# Patient Record
Sex: FEMALE | Race: WHITE | NOT HISPANIC OR LATINO | Employment: FULL TIME | URBAN - METROPOLITAN AREA
[De-identification: names, ages, dates, MRNs, and addresses within clinical notes are randomized per-mention and may not be internally consistent; named-entity substitution may affect disease eponyms.]

---

## 2017-06-16 ENCOUNTER — HOSPITAL ENCOUNTER (EMERGENCY)
Facility: CLINIC | Age: 22
Discharge: HOME | End: 2017-06-16
Attending: PHYSICIAN ASSISTANT | Admitting: PHYSICIAN ASSISTANT

## 2017-06-16 ENCOUNTER — APPOINTMENT (OUTPATIENT)
Dept: CT IMAGING | Facility: CLINIC | Age: 22
End: 2017-06-16
Attending: PHYSICIAN ASSISTANT

## 2017-06-16 VITALS
SYSTOLIC BLOOD PRESSURE: 114 MMHG | OXYGEN SATURATION: 98 % | DIASTOLIC BLOOD PRESSURE: 70 MMHG | HEIGHT: 67 IN | BODY MASS INDEX: 31.39 KG/M2 | RESPIRATION RATE: 16 BRPM | TEMPERATURE: 98.3 F | WEIGHT: 200 LBS

## 2017-06-16 DIAGNOSIS — S09.90XA CLOSED HEAD INJURY, INITIAL ENCOUNTER: ICD-10-CM
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
## 2017-06-16 DIAGNOSIS — R51.9 NONINTRACTABLE HEADACHE, UNSPECIFIED CHRONICITY PATTERN, UNSPECIFIED HEADACHE TYPE: ICD-10-CM

## 2017-06-16 PROCEDURE — 70450 CT HEAD/BRAIN W/O DYE: CPT

## 2017-06-16 PROCEDURE — 25000132 ZZH RX MED GY IP 250 OP 250 PS 637: Performed by: PHYSICIAN ASSISTANT

## 2017-06-16 PROCEDURE — 99284 EMERGENCY DEPT VISIT MOD MDM: CPT | Mod: 25

## 2017-06-16 RX ORDER — ACETAMINOPHEN 325 MG/1
650 TABLET ORAL ONCE
Status: COMPLETED | OUTPATIENT
Start: 2017-06-16 | End: 2017-06-16

## 2017-06-16 RX ADMIN — ACETAMINOPHEN 650 MG: 325 TABLET, FILM COATED ORAL at 13:38

## 2017-06-16 ASSESSMENT — ENCOUNTER SYMPTOMS
ABDOMINAL PAIN: 0
FEVER: 0
VOMITING: 0
NECK PAIN: 0
BACK PAIN: 0
HEADACHES: 1
DIZZINESS: 0
NUMBNESS: 0
NAUSEA: 0
WEAKNESS: 0
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
## 2017-06-16 NOTE — DISCHARGE INSTRUCTIONS
Discharge Instructions  Head Injury    You have been seen today for a head injury. You were checked for serious problems, like bleeding on the brain, but these problems cannot always be found right away.  Due to this risk, you should not be alone for 24 hours after your injury.  Follow up with your regular physician in 3 days if not improving.    Return to the Emergency Department if:    You are confused, have amnesia, or you are not acting right.    Your headache gets worse or you start to have a really bad headache even with your recommended treatment plan.    You vomit more than once.    You have a convulsion or seizure.    You have trouble walking.    You have weakness or paralysis in an arm or a leg.    You have blood or fluid coming from your ears or nose.    You have new symptoms or anything that worries you.    Sleeping:  It is okay for you to sleep, but someone should wake you up as instructed by your doctor, and someone should check on you at your usual time to wake up.     Activity:    Do not drive for at least 24 hours.    Do not drive if you have dizzy spells or trouble concentrating, or remembering things.    Do not return to any contact sports until cleared by your regular doctor.     Follow-up:  It is very important that you make an appointment with your clinic and go to the appointment.  If you do not follow-up with your regular doctor, it may result in missing an important development which could result in permanent injury or disability and/or lasting pain.  If there is any problem keeping your appointment, call your doctor or return to the Emergency Department.    MORE INFORMATION:    Concussion:  A concussion is a minor head injury that may cause temporary problems with the way your brain works.  Some symptoms include:  confusion, amnesia, nausea and vomiting, dizziness, fatigue, memory or concentration problems, irritability and sleep problems.    CT Scans: Your evaluation today may have  included a CT scan (CAT scan) to look for things like bleeding or a skull fracture (break).  CT scans involve radiation and too many CT scans can cause serious health problems like cancer, especially in children.  Because of this, your doctor may not have ordered a CT scan today if they think you are at low risk for a serious or life threatening problem.    If you were given a prescription for medicine here today, be sure to read all of the information (including the package insert) that comes with your prescription.  This will include important information about the medicine, its side effects, and any warnings that you need to know about.  The pharmacist who fills the prescription can provide more information and answer questions you may have about the medicine.  If you have questions or concerns that the pharmacist cannot address, please call or return to the Emergency Department.     Concussions  [facility]  What is a concussion?  A concussion is a mild traumatic brain injury (TBI) that occurs from a direct blow, bump or jolt to the head. It can also result from a blow to another part of the body when the force travels up to the head. A concussion can affect coordination, learning, memory and emotions.  Most concussions heal without issue. However, like any other injury, a brain injury should be given time to heal, which includes physical and mental rest (free from mental straining and visual stimuli like video rema and texting).  Signs and symptoms  Common signs and symptoms may include:    Altered mental status, including confusion, inappropriate emotions, agitation or abrupt change in personality    Amnesia/memory problems    Blurred vision, double vision, seeing stars or black spots    Dizziness, poor balance, or unsteadiness    Excessive or persistent headache    Excessive fatigue    Excessive sensitivity to light or loud noise    Feel  in a fog     Loss of consciousness or  orientation    Poor balance/coordination    Ringing in ears    Vacant stare/glassy eyed    Vomiting  One of the most severe problems that can occur is bleeding inside the brain. If bleeding or swelling of the brain occurs, pressure in the skull rises and can cause brain injury. Bleeding may develop right after an injury or hours later, so monitoring symptoms is critical. This can be life threatening.    Why do a baseline computer test (ImPACT)?  Neurocognitive tests, such as ImPACT, provide information about how the brain is responding to injury. ImPACT has two components: a pre- and post-concussion test. The pretest scoring represents one s baseline (normal) brain function.  The test is then repeated post injury. Results of the pre- and post-concussion tests are compared and a care plan is developed. If a pre-test was not completed prior to a concussion, a post-concussion test is still helpful in the assessment of brain function. An ImPACT test should be completed yearly due to the natural maturing of the brain.    What can I expect from the [facility] concussion program? [facility] providers will:    Facilitate completion of baseline tests    Manage concussion symptoms and make recommendations for return to previous activity level    Facilitate post-concussion testing    Refer to other health-care providers, as needed,  including neuropsychologists, neurologists and therapists who specialize in concussion management    When is it safe to return to activity?  A person should be free of symptoms and have returned  to normal sleeping and eating patterns as well as typical concentration levels before resuming activity. Once normal activities have resumed and there are no symptoms at rest, more demanding activities may be tried. Over time, activity will be increased as long as symptoms do not return. A gradual return to activities allows us the opportunity to assess brain healing.  Under  no circumstances should anyone return to activity while experiencing concussion signs or symptoms.    For more information contact:  Concussion hotline: [phone]  To schedule an appointment: [phone]  Cost of computer testing (ImPACT)  Pre-concussion testing - $5  Post-concussion testing:    Option 1: $20 includes all post testing; excludes doctor review.    Option 2: [facility] doctor visit, including all post testing and review.
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
## 2017-06-16 NOTE — ED AVS SNAPSHOT
Emergency Department    [address] [place] [address]    Phone:  [phone]    Fax:  [phone]                                       Arelis Lomeli   MRN: [id number]    Department:   Emergency Department   Date of Visit:  6/16/2017           After Visit Summary Signature Page     I have received my discharge instructions, and my questions have been answered. I have discussed any challenges I see with this plan with the nurse or doctor.    ..........................................................................................................................................  Patient/Patient Representative Signature      ..........................................................................................................................................  Patient Representative Print Name and Relationship to Patient    ..................................................               ................................................  Date                                            Time    ..........................................................................................................................................  Reviewed by Signature/Title    ...................................................              ..............................................  Date                                                            Time

## 2017-06-28 ENCOUNTER — OFFICE VISIT (OUTPATIENT)
Dept: OBGYN | Facility: CLINIC | Age: 22
End: 2017-06-28
Payer: COMMERCIAL

## 2017-06-28 VITALS
BODY MASS INDEX: 31.48 KG/M2 | DIASTOLIC BLOOD PRESSURE: 70 MMHG | OXYGEN SATURATION: 98 % | HEART RATE: 86 BPM | SYSTOLIC BLOOD PRESSURE: 134 MMHG | WEIGHT: 201 LBS

## 2017-06-28 DIAGNOSIS — Z20.2 EXPOSURE TO SEXUALLY TRANSMITTED DISEASE (STD): Primary | ICD-10-CM

## 2017-06-28 DIAGNOSIS — Z11.3 SCREEN FOR STD (SEXUALLY TRANSMITTED DISEASE): ICD-10-CM

## 2017-06-28 PROCEDURE — 87591 N.GONORRHOEAE DNA AMP PROB: CPT | Performed by: ADVANCED PRACTICE MIDWIFE

## 2017-06-28 PROCEDURE — 99213 OFFICE O/P EST LOW 20 MIN: CPT | Performed by: ADVANCED PRACTICE MIDWIFE

## 2017-06-28 PROCEDURE — 87491 CHLMYD TRACH DNA AMP PROBE: CPT | Performed by: ADVANCED PRACTICE MIDWIFE

## 2017-06-28 RX ORDER — AZITHROMYCIN 500 MG/1
1000 TABLET, FILM COATED ORAL ONCE
Qty: 2 TABLET | Refills: 0 | Status: SHIPPED | OUTPATIENT
Start: 2017-06-28 | End: 2017-06-28

## 2017-06-28 RX ORDER — AZITHROMYCIN 1 G/1
1 POWDER, FOR SUSPENSION ORAL ONCE
Qty: 1 EACH | Refills: 0 | Status: SHIPPED | OUTPATIENT
Start: 2017-06-28 | End: 2017-06-28

## 2017-06-28 NOTE — NURSING NOTE
"Chief Complaint   Patient presents with     STD   Partner tested positive for chlamydia     Initial /70 (BP Location: Right arm, Cuff Size: Adult Large)  Pulse 86  Wt 201 lb (91.2 kg)  LMP 06/05/2017 (Exact Date)  SpO2 98%  BMI 31.48 kg/m2 Estimated body mass index is 31.48 kg/(m^2) as calculated from the following:    Height as of 6/16/17: 5' 7\" (1.702 m).    Weight as of this encounter: 201 lb (91.2 kg).  Medication Reconciliation: complete   Deena Ventura MA      "

## 2017-06-28 NOTE — MR AVS SNAPSHOT
"              After Visit Summary   6/28/2017    Arelis Lomeli    MRN: [id number]           Patient Information     Date Of Birth          1995        Visit Information        Provider Department      6/28/2017 9:45 AM Shena Trinidad APRN CNM [facility]        Today's Diagnoses     Exposure to sexually transmitted disease (STD)    -  1    Screen for STD (sexually transmitted disease)           Follow-ups after your visit        Follow-up notes from your care team     Return in about 1 month (around 7/28/2017) for test of cure.      Who to contact     If you have questions or need follow up information about today's clinic visit or your schedule please contact [facility] directly at [phone].  Normal or non-critical lab and imaging results will be communicated to you by MyChart, letter or phone within 4 business days after the clinic has received the results. If you do not hear from us within 7 days, please contact the clinic through MyChart or phone. If you have a critical or abnormal lab result, we will notify you by phone as soon as possible.  Submit refill requests through [employer] or call your pharmacy and they will forward the refill request to us. Please allow 3 business days for your refill to be completed.          Additional Information About Your Visit        MyChart Information     Allmoxy lets you send messages to your doctor, view your test results, renew your prescriptions, schedule appointments and more. To sign up, go to [email or web address] . Click on \"Log in\" on the left side of the screen, which will take you to the Welcome page. Then click on \"Sign up Now\" on the right side of the page.     You will be asked to enter the access code listed below, as well as some personal information. Please follow the directions to create your username and password.     Your access code is: [email or web address]  Expires: 9/14/2017  2:28 PM   "   Your access code will  in 90 days. If you need help or a new code, please call your [facility] clinic or [phone].        Care EveryWhere ID     This is your Care EveryWhere ID. This could be used by other organizations to access your [facility] medical records  [id number]        Your Vitals Were     Pulse Last Period Pulse Oximetry BMI (Body Mass Index)          86 2017 (Exact Date) 98% 31.48 kg/m2         Blood Pressure from Last 3 Encounters:   17 134/70   17 114/70   16 100/62    Weight from Last 3 Encounters:   17 201 lb (91.2 kg)   17 200 lb (90.7 kg)   16 195 lb (88.5 kg)              We Performed the Following     CHLAMYDIA TRACHOMATIS PCR     NEISSERIA GONORRHOEA PCR          Today's Medication Changes          These changes are accurate as of: 17 10:05 AM.  If you have any questions, ask your nurse or doctor.               Start taking these medicines.        Dose/Directions    azithromycin 500 MG tablet   Commonly known as:  ZITHROMAX   Used for:  Exposure to sexually transmitted disease (STD)   Started by:  Shena Trinidad APRN CNM        Dose:  1000 mg   Take 2 tablets (1,000 mg) by mouth once for 1 dose   Quantity:  2 tablet   Refills:  0            Where to get your medicines      These medications were sent to [facility] [id number] [place] [address] [address] AT [place] [address] [address], [facility] [address]    Hours:  24-hours Phone:  [phone]     azithromycin 500 MG tablet                Primary Care Provider Office Phone # Fax #    [facility] [phone] [address] [address]        Equal Access to Services     ALESSIO BECERRA AH: Deejay Sheldon, wapili lusena, qaybta kaalmada ena, carlos napier. So [facility] [phone].    ATENCIÓN: Si habla español, tiene a ruiz disposición servicios gratuitos de  asistencia lingüística. Smooth al [phone].    We comply with applicable federal civil rights laws and [place] laws. We do not discriminate on the basis of race, color, national origin, age, disability sex, sexual orientation or gender identity.            Thank you!     Thank you for choosing [facility]  for your care. Our goal is always to provide you with excellent care. Hearing back from our patients is one way we can continue to improve our services. Please take a few minutes to complete the written survey that you may receive in the mail after your visit with us. Thank you!             Your Updated Medication List - Protect others around you: Learn how to safely use, store and throw away your medicines at www.disposemymeds.org.          This list is accurate as of: 6/28/17 10:05 AM.  Always use your most recent med list.                   Brand Name Dispense Instructions for use Diagnosis    azithromycin 500 MG tablet    ZITHROMAX    2 tablet    Take 2 tablets (1,000 mg) by mouth once for 1 dose    Exposure to sexually transmitted disease (STD)       ibuprofen 200 MG tablet    ADVIL/MOTRIN     Take 200 mg by mouth every 4 hours as needed for mild pain        levonorgestrel-ethinyl estradiol 0.1-20 MG-MCG per tablet    SRONYX    84 tablet    Take 1 tablet by mouth daily    Surveillance of previously prescribed contraceptive pill       TYLENOL 325 MG Caps   Generic drug:  Acetaminophen      Take by mouth as needed
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
## 2017-06-29 LAB
C TRACH DNA SPEC QL NAA+PROBE: ABNORMAL
N GONORRHOEA DNA SPEC QL NAA+PROBE: NORMAL
SPECIMEN SOURCE: ABNORMAL
SPECIMEN SOURCE: NORMAL

## 2017-06-29 NOTE — PROGRESS NOTES
I spoke with Arelis and informed her of results. Questions answered. Took treatment yesterday, will return to clinic for test of cure in about a month.    Shena Trinidad

## 2018-10-26 ENCOUNTER — TELEPHONE (OUTPATIENT)
Dept: OBGYN | Facility: CLINIC | Age: 23
End: 2018-10-26

## 2018-10-26 NOTE — TELEPHONE ENCOUNTER
Pt is past due for f/u pap smear after previous abnormal.  LMTC and schedule at [facility].  Olga Brown,    Pap Tracking

## 2019-01-30 ENCOUNTER — OFFICE VISIT (OUTPATIENT)
Dept: URGENT CARE | Facility: URGENT CARE | Age: 24
End: 2019-01-30
Payer: COMMERCIAL

## 2019-01-30 VITALS
TEMPERATURE: 97.5 F | WEIGHT: 230 LBS | SYSTOLIC BLOOD PRESSURE: 129 MMHG | OXYGEN SATURATION: 96 % | DIASTOLIC BLOOD PRESSURE: 78 MMHG | HEART RATE: 100 BPM | BODY MASS INDEX: 36.02 KG/M2

## 2019-01-30 DIAGNOSIS — R09.81 NASAL CONGESTION: ICD-10-CM

## 2019-01-30 DIAGNOSIS — B34.9 VIRAL SYNDROME: Primary | ICD-10-CM

## 2019-01-30 PROCEDURE — 99213 OFFICE O/P EST LOW 20 MIN: CPT | Performed by: PHYSICIAN ASSISTANT

## 2019-01-30 RX ORDER — FLUTICASONE PROPIONATE 50 MCG
2 SPRAY, SUSPENSION (ML) NASAL DAILY
Qty: 16 G | Refills: 0 | Status: SHIPPED | OUTPATIENT
Start: 2019-01-30 | End: 2020-11-03

## 2019-01-30 NOTE — LETTER
[facility]  [address] [address]  [phone]      January 30, 2019    RE:  Arelis Lomeli                                                                                                                                                       [address] [facility] [address]            To whom it may concern:    Arelis Lomeli was seen in clinic today for illness.  She may return to work when she has been fever free for 24 hours.          Sincerely,        Kaye Macdonald    [facility]

## 2019-01-31 NOTE — PROGRESS NOTES
Patient presents with:  Cough: x 6 days, throat pain / discomfort, runny nose, congestion nasal drainage, pain / discomfort located in the forehead, denies constipation, diarrhea, fever, chills    SUBJECTIVE:   Arelis Lomeli is a 23 year old female presenting with a chief complaint of:  1) runny nose, congestion   2) sore throat  3) cough, intermittently productive  No known fevers.    No wheezing.    4) Fatigue      Onset of symptoms was as above  Course of illness is worsening.    Severity moderate  Current and Associated symptoms: as above  Treatment measures tried include nyquil.  Predisposing factors include none.  She did have a flu shot this season    Past Medical History:   Diagnosis Date     Atypical squamous cells of undetermined significance (ASCUS) on Papanicolaou smear of cervix 09/06/2016    No HPV was done due to pts. age     NO ACTIVE PROBLEMS      Patient Active Problem List   Diagnosis     Surveillance of previously prescribed contraceptive pill     Papanicolaou smear of cervix with atypical squamous cells of undetermined significance (ASC-US)     Social History     Tobacco Use     Smoking status: Never Smoker     Smokeless tobacco: Never Used   Substance Use Topics     Alcohol use: Yes     Alcohol/week: 0.0 oz     Comment: social       ROS:  CONSTITUTIONAL: as per HPI  INTEGUMENTARY/SKIN: NEGATIVE for worrisome rashes, moles or lesions  ENT/MOUTH: as per HPI  RESP:as per HPI  CV: NEGATIVE for chest pain, palpitations or peripheral edema  GI: NEGATIVE for nausea, abdominal pain, heartburn, or change in bowel habits  MUSCULOSKELETAL: NEGATIVE for significant arthralgias or myalgia  NEURO: NEGATIVE for weakness, dizziness or paresthesias  Review of systems negative except as stated above.    OBJECTIVE  :/78   Pulse 100   Temp 97.5  F (36.4  C)   Wt 104.3 kg (230 lb)   SpO2 96%   Breastfeeding? No   BMI 36.02 kg/m    GENERAL APPEARANCE: healthy, alert and no distress  EYES: EOMI,   PERRL, conjunctiva clear  HENT: ear canals and TM's normal.  Nose and mouth without ulcers, erythema or lesions.  Post nasal drip  HENT: nasal turbinates erythematous and edematous and rhinorrhea yellow  NECK: supple, nontender, no lymphadenopathy  RESP: lungs clear to auscultation - no rales, rhonchi or wheezes  CV: regular rates and rhythm, normal S1 S2, no murmur noted  ABDOMEN:  soft, nontender, no HSM or masses and bowel sounds normal  NEURO: Normal strength and tone, sensory exam grossly normal,  normal speech and mentation  SKIN: no suspicious lesions or rashes    (B34.9) Viral syndrome  (primary encounter diagnosis)  Comment:   Plan: rest.  Ibuprofen as needed for pain, fever.    Salt water gargles.    Continue nyquil at bedtime, use mucinex during the day.        (R09.81) Nasal congestion  Comment:   Plan: fluticasone (FLONASE) 50 MCG/ACT nasal spray            Follow up with primary clinic should symptoms persist or worsen.      Patient expresses understanding and agreement with the assessment and plan as above.

## 2019-01-31 NOTE — PATIENT INSTRUCTIONS
"  (B34.9) Viral syndrome  (primary encounter diagnosis)  Comment:   Plan: rest.  Ibuprofen as needed for pain, fever.    Salt water gargles.    Continue nyquil at bedtime, use mucinex during the day.        (R09.81) Nasal congestion  Comment:   Plan: fluticasone (FLONASE) 50 MCG/ACT nasal spray            Follow up with primary clinic should symptoms persist or worsen.        Patient Education     Viral Syndrome (Adult)  A viral illness may cause a number of symptoms such as fever. Other symptoms depend on the part of the body that the virus affects. If it settles in your nose, throat, and lungs, it may cause cough, sore throat, congestion, runny nose, headache, earache and other ear symptoms, or shortness of breath. If it settles in your stomach and intestinal tract, it may cause nausea, vomiting, cramping, and diarrhea. Sometimes it causes generalized symptoms like \"aching all over,\" feeling tired, loss of energy, or loss of appetite.  A viral illness usually lasts anywhere from several days to several weeks, but sometimes it lasts longer. In some cases, a more serious infection can look like a viral syndrome in the first few days of the illness. You may need another exam and additional tests to know the difference. Watch for the warning signs listed below for when to seek medical advice.  Home care  Follow these guidelines for taking care of yourself at home:    If symptoms are severe, rest at home for the first 2 to 3 days.    Stay away from cigarette smoke - both your smoke and the smoke from others.    You may use over-the-counter acetaminophen or ibuprofen for fever, muscle aching, and headache, unless another medicine was prescribed for this. If you have chronic liver or kidney disease or ever had a stomach ulcer or gastrointestinal bleeding, talk with your healthcare provider before using these medicines. No one who is younger than 18 and ill with a fever should take aspirin. It may cause severe disease or " death.    Your appetite may be poor, so a light diet is fine. Avoid dehydration by drinking 8 to 12, 8-ounce glasses of fluids each day. This may include water; orange juice; lemonade; apple, grape, and cranberry juice; clear fruit drinks; electrolyte replacement and sports drinks; and decaffeinated teas and coffee. If you have been diagnosed with a kidney disease, ask your healthcare provider how much and what types of fluids you should drink to prevent dehydration. If you have kidney disease, drinking too much fluid can cause it build up in the your body and be dangerous to your health.    Over-the-counter remedies won't shorten the length of the illness but may be helpful for symptoms such as cough, sore throat, nasal and sinus congestion, or diarrhea. Don't use decongestants if you have high blood pressure.  Follow-up care  Follow up with your healthcare provider if you do not improve over the next week.  Call 911  Call 911 if any of the following occur:    Convulsion    Feeling weak, dizzy, or like you are going to faint    Chest pain, or more than mild shortness of breath   When to seek medical advice  Call your healthcare provider right away if any of these occur:    Cough with lots of colored sputum (mucus) or blood in your sputum    Chest pain, shortness of breath, wheezing, or trouble breathing    Severe headache; face, neck, or ear pain    Severe, constant pain in the lower right side of your belly (abdominal)    Continued vomiting (can t keep liquids down)    Frequent diarrhea (more than 5 times a day); blood (red or black color) or mucus in diarrhea    Feeling weak, dizzy, or like you are going to faint    Extreme thirst    Fever of 100.4 F (38 C) or higher, or as directed by your healthcare provider  Date Last Reviewed: 4/1/2018 2000-2018 [employer]. [address] [place] [address], [place] [address]. All rights reserved. This information is not intended as a substitute for professional  medical care. Always follow your healthcare professional's instructions.

## 2019-04-28 ENCOUNTER — HOSPITAL ENCOUNTER (EMERGENCY)
Facility: CLINIC | Age: 24
Discharge: HOME | End: 2019-04-29
Attending: EMERGENCY MEDICINE | Admitting: EMERGENCY MEDICINE
Payer: COMMERCIAL

## 2019-04-28 DIAGNOSIS — R10.33 PERIUMBILICAL ABDOMINAL PAIN: ICD-10-CM

## 2019-04-28 LAB
ALBUMIN SERPL-MCNC: 3.3 G/DL (ref 3.4–5)
ALP SERPL-CCNC: 114 U/L (ref 40–150)
ALT SERPL W P-5'-P-CCNC: 19 U/L (ref 0–50)
ANION GAP SERPL CALCULATED.3IONS-SCNC: 6 MMOL/L (ref 3–14)
AST SERPL W P-5'-P-CCNC: 13 U/L (ref 0–45)
BASOPHILS # BLD AUTO: 0 10E9/L (ref 0–0.2)
BASOPHILS NFR BLD AUTO: 0.1 %
BILIRUB SERPL-MCNC: 0.3 MG/DL (ref 0.2–1.3)
BUN SERPL-MCNC: 8 MG/DL (ref 7–30)
CALCIUM SERPL-MCNC: 8.5 MG/DL (ref 8.5–10.1)
CHLORIDE SERPL-SCNC: 108 MMOL/L (ref 94–109)
CO2 SERPL-SCNC: 25 MMOL/L (ref 20–32)
CREAT SERPL-MCNC: 1.07 MG/DL (ref 0.52–1.04)
DIFFERENTIAL METHOD BLD: NORMAL
EOSINOPHIL # BLD AUTO: 0.2 10E9/L (ref 0–0.7)
EOSINOPHIL NFR BLD AUTO: 2.1 %
ERYTHROCYTE [DISTWIDTH] IN BLOOD BY AUTOMATED COUNT: 13.4 % (ref 10–15)
GFR SERPL CREATININE-BSD FRML MDRD: 72 ML/MIN/{1.73_M2}
GLUCOSE SERPL-MCNC: 105 MG/DL (ref 70–99)
HCT VFR BLD AUTO: 40.5 % (ref 35–47)
HGB BLD-MCNC: 13.6 G/DL (ref 11.7–15.7)
IMM GRANULOCYTES # BLD: 0 10E9/L (ref 0–0.4)
IMM GRANULOCYTES NFR BLD: 0.1 %
LIPASE SERPL-CCNC: 106 U/L (ref 73–393)
LYMPHOCYTES # BLD AUTO: 2.2 10E9/L (ref 0.8–5.3)
LYMPHOCYTES NFR BLD AUTO: 29.4 %
MCH RBC QN AUTO: 28.6 PG (ref 26.5–33)
MCHC RBC AUTO-ENTMCNC: 33.6 G/DL (ref 31.5–36.5)
MCV RBC AUTO: 85 FL (ref 78–100)
MONOCYTES # BLD AUTO: 0.4 10E9/L (ref 0–1.3)
MONOCYTES NFR BLD AUTO: 5 %
NEUTROPHILS # BLD AUTO: 4.8 10E9/L (ref 1.6–8.3)
NEUTROPHILS NFR BLD AUTO: 63.3 %
NRBC # BLD AUTO: 0 10*3/UL
NRBC BLD AUTO-RTO: 0 /100
PLATELET # BLD AUTO: 398 10E9/L (ref 150–450)
POTASSIUM SERPL-SCNC: 3.9 MMOL/L (ref 3.4–5.3)
PROT SERPL-MCNC: 6.9 G/DL (ref 6.8–8.8)
RBC # BLD AUTO: 4.75 10E12/L (ref 3.8–5.2)
SODIUM SERPL-SCNC: 139 MMOL/L (ref 133–144)
WBC # BLD AUTO: 7.6 10E9/L (ref 4–11)

## 2019-04-28 PROCEDURE — 81001 URINALYSIS AUTO W/SCOPE: CPT | Performed by: EMERGENCY MEDICINE

## 2019-04-28 PROCEDURE — 81025 URINE PREGNANCY TEST: CPT | Performed by: EMERGENCY MEDICINE

## 2019-04-28 PROCEDURE — 99285 EMERGENCY DEPT VISIT HI MDM: CPT | Mod: 25

## 2019-04-28 PROCEDURE — 83690 ASSAY OF LIPASE: CPT | Performed by: EMERGENCY MEDICINE

## 2019-04-28 PROCEDURE — 80053 COMPREHEN METABOLIC PANEL: CPT | Performed by: EMERGENCY MEDICINE

## 2019-04-28 PROCEDURE — 85025 COMPLETE CBC W/AUTO DIFF WBC: CPT | Performed by: EMERGENCY MEDICINE

## 2019-04-28 ASSESSMENT — MIFFLIN-ST. JEOR: SCORE: 1801.5

## 2019-04-28 NOTE — LETTER
April 29, 2019      To Whom It May Concern:      Arelis Lomeli was seen in our Emergency Department today, 04/29/19.  I expect her condition to improve over the next 1-4 days.  She may return to work/school when improved.    Sincerely,        Bin Vergara MD

## 2019-04-28 NOTE — ED AVS SNAPSHOT
Emergency Department  [address] [place] [address]  Phone:  [phone]  Fax:  [phone]                                    Arelis Lomeli   MRN: [id number]    Department:   Emergency Department   Date of Visit:  4/28/2019           After Visit Summary Signature Page    I have received my discharge instructions, and my questions have been answered. I have discussed any challenges I see with this plan with the nurse or doctor.    ..........................................................................................................................................  Patient/Patient Representative Signature      ..........................................................................................................................................  Patient Representative Print Name and Relationship to Patient    ..................................................               ................................................  Date                                   Time    ..........................................................................................................................................  Reviewed by Signature/Title    ...................................................              ..............................................  Date                                               Time          22EPIC Rev 08/18

## 2019-04-29 ENCOUNTER — APPOINTMENT (OUTPATIENT)
Dept: CT IMAGING | Facility: CLINIC | Age: 24
End: 2019-04-29
Attending: EMERGENCY MEDICINE
Payer: COMMERCIAL

## 2019-04-29 VITALS
DIASTOLIC BLOOD PRESSURE: 78 MMHG | TEMPERATURE: 99.1 F | SYSTOLIC BLOOD PRESSURE: 103 MMHG | WEIGHT: 222.22 LBS | OXYGEN SATURATION: 100 % | HEART RATE: 67 BPM | BODY MASS INDEX: 33.68 KG/M2 | HEIGHT: 68 IN | RESPIRATION RATE: 16 BRPM

## 2019-04-29 LAB
ALBUMIN UR-MCNC: 10 MG/DL
APPEARANCE UR: CLEAR
BACTERIA #/AREA URNS HPF: ABNORMAL /HPF
BILIRUB UR QL STRIP: NEGATIVE
COLOR UR AUTO: YELLOW
GLUCOSE UR STRIP-MCNC: NEGATIVE MG/DL
HCG UR QL: NEGATIVE
HGB UR QL STRIP: ABNORMAL
KETONES UR STRIP-MCNC: NEGATIVE MG/DL
LEUKOCYTE ESTERASE UR QL STRIP: ABNORMAL
MUCOUS THREADS #/AREA URNS LPF: PRESENT /LPF
NITRATE UR QL: NEGATIVE
PH UR STRIP: 6.5 PH (ref 5–7)
RBC #/AREA URNS AUTO: 3 /HPF (ref 0–2)
SOURCE: ABNORMAL
SP GR UR STRIP: 1.02 (ref 1–1.03)
SQUAMOUS #/AREA URNS AUTO: 3 /HPF (ref 0–1)
UROBILINOGEN UR STRIP-MCNC: 2 MG/DL (ref 0–2)
WBC #/AREA URNS AUTO: 1 /HPF (ref 0–5)

## 2019-04-29 PROCEDURE — 96375 TX/PRO/DX INJ NEW DRUG ADDON: CPT

## 2019-04-29 PROCEDURE — 25000128 H RX IP 250 OP 636: Performed by: EMERGENCY MEDICINE

## 2019-04-29 PROCEDURE — 74177 CT ABD & PELVIS W/CONTRAST: CPT

## 2019-04-29 PROCEDURE — 96374 THER/PROPH/DIAG INJ IV PUSH: CPT | Mod: 59

## 2019-04-29 PROCEDURE — 25000125 ZZHC RX 250: Performed by: EMERGENCY MEDICINE

## 2019-04-29 PROCEDURE — 25000132 ZZH RX MED GY IP 250 OP 250 PS 637: Performed by: EMERGENCY MEDICINE

## 2019-04-29 RX ORDER — ONDANSETRON 4 MG/1
4 TABLET, ORALLY DISINTEGRATING ORAL EVERY 8 HOURS PRN
Qty: 10 TABLET | Refills: 0 | Status: SHIPPED | OUTPATIENT
Start: 2019-04-29 | End: 2019-05-02

## 2019-04-29 RX ORDER — ONDANSETRON 2 MG/ML
4 INJECTION INTRAMUSCULAR; INTRAVENOUS ONCE
Status: COMPLETED | OUTPATIENT
Start: 2019-04-29 | End: 2019-04-29

## 2019-04-29 RX ORDER — TRAMADOL HYDROCHLORIDE 50 MG/1
50 TABLET ORAL EVERY 6 HOURS PRN
Qty: 10 TABLET | Refills: 0 | Status: SHIPPED | OUTPATIENT
Start: 2019-04-29 | End: 2019-05-02

## 2019-04-29 RX ORDER — MORPHINE SULFATE 4 MG/ML
4 INJECTION, SOLUTION INTRAMUSCULAR; INTRAVENOUS ONCE
Status: COMPLETED | OUTPATIENT
Start: 2019-04-29 | End: 2019-04-29

## 2019-04-29 RX ORDER — IOPAMIDOL 755 MG/ML
112 INJECTION, SOLUTION INTRAVASCULAR ONCE
Status: COMPLETED | OUTPATIENT
Start: 2019-04-29 | End: 2019-04-29

## 2019-04-29 RX ADMIN — FAMOTIDINE 20 MG: 10 INJECTION INTRAVENOUS at 00:47

## 2019-04-29 RX ADMIN — LIDOCAINE HYDROCHLORIDE 30 ML: 20 SOLUTION ORAL; TOPICAL at 00:20

## 2019-04-29 RX ADMIN — MORPHINE SULFATE 4 MG: 4 INJECTION INTRAVENOUS at 00:46

## 2019-04-29 RX ADMIN — ONDANSETRON 4 MG: 2 INJECTION INTRAMUSCULAR; INTRAVENOUS at 00:46

## 2019-04-29 RX ADMIN — IOPAMIDOL 112 ML: 755 INJECTION, SOLUTION INTRAVENOUS at 01:46

## 2019-04-29 RX ADMIN — SODIUM CHLORIDE 1000 ML: 9 INJECTION, SOLUTION INTRAVENOUS at 00:44

## 2019-04-29 RX ADMIN — SODIUM CHLORIDE 74 ML: 9 INJECTION, SOLUTION INTRAVENOUS at 01:46

## 2019-04-29 ASSESSMENT — ENCOUNTER SYMPTOMS
ABDOMINAL PAIN: 1
FREQUENCY: 0
CONSTIPATION: 0
DYSURIA: 0
VOMITING: 1
SHORTNESS OF BREATH: 1
DIARRHEA: 0
TROUBLE SWALLOWING: 0
CHILLS: 0
NAUSEA: 1
BLOOD IN STOOL: 0
FEVER: 0

## 2019-04-29 NOTE — ED PROVIDER NOTES
"  History     Chief Complaint:  Abdominal Pain      HPI   Arelis Lomeli is a 23 year old female with a history of UTI who presents to the ED for evaluation of abdominal pain. The patient reports that she has been experiencing waxing and waning abdominal pain and nausea for the past week. She first woke up with a \"stabbing\" periumbilical pain one week ago and had a transient syncopal episode while urinating. Since then, the patient's discomfort has been largely constant with waves of pain every 6-8 hours. She notes that her abdominal pain is worse 30 minutes after eating and has been progressively worsening since onset. Tonight, she reportedly took a suppository and had a subsequent bowel movement without improvement in her discomfort, and thus she presented secondary to the persistence of her discomfort. Here in the ED, the patient states that her abdominal pain has begun to radiate through to her back and she is now experiencing some shortness of breath. She additionally reports having one episode of emesis this morning. The patient denies any diarrhea, black or bloody stools, dysuria, urinary frequency, fevers, chills, or difficulties swallowing here. She has no family history of ulcerative colitis or Crohn's disease. Her last menstrual period was two weeks ago, and she just took a negative pregnancy test tonight. Of note, the patient states that she did drink heavily at a work conference two nights ago.    Allergies:  NKDA     Medications:    Sronyx  Flonase     Past Medical History:    ASCUS on pap smear  UTI  Tonsillitis  Asthma    Past Surgical History:    Tonsillectomy  Lasik    Family History:    Diabetes, type I    Social History:  Negative for tobacco use.  Alcohol Use: Yes - social  Negative for drug use.   Marital Status:  Single [1]     Review of Systems   Constitutional: Negative for chills and fever.   HENT: Negative for trouble swallowing.    Respiratory: Positive for shortness of breath.  " "  Gastrointestinal: Positive for abdominal pain, nausea and vomiting. Negative for blood in stool, constipation and diarrhea.   Genitourinary: Negative for dysuria and frequency.   All other systems reviewed and are negative.        Physical Exam     Patient Vitals for the past 24 hrs:   BP Temp Temp src Pulse Resp SpO2 Height Weight   04/29/19 0113 -- -- -- 67 -- 100 % -- --   04/28/19 2307 103/78 99.1  F (37.3  C) Oral 97 16 99 % 1.727 m (5' 8\") 100.8 kg (222 lb 3.6 oz)        Physical Exam  Constitutional:  Appears well-developed and well-nourished. Appears uncomfortable. Cooperative.   HENT:   Head:    Atraumatic.   Mouth/Throat:   Oropharynx is without erythema or exudate and mucous     membranes are moist.   Eyes:    Conjunctivae normal and EOM are normal.      Pupils are equal, round, and reactive to light.   Neck:    Normal range of motion. Neck supple.   Cardiovascular:  Normal rate, regular rhythm, normal heart sounds and radial and    dorsalis pedis pulses are 2+ and symmetric.    Pulmonary/Chest:  Effort normal and breath sounds normal.   Abdominal:   Soft. Bowel sounds are normal.      No splenomegaly or hepatomegaly.      Diffusely tender mid upper abdomen. No guarding. No rebound.     Negative Horner's sign.   Musculoskeletal:  Normal range of motion. No edema and no tenderness.   Neurological:  Alert. Normal strength. No cranial nerve deficit.   Skin:    Skin is warm and dry.   Psychiatric:   Normal mood and affect.      Emergency Department Course     Imaging:  Radiographic findings were communicated with the patient who voiced understanding of the findings.  CT Abdomen/Pelvis with IV contrast:   1. Moderate enteric fluid could be due to enteritis.  2. No other acute cause of pain identified.  3. Trace pelvic fluid, within normal physiologic limits, as per radiology.     Laboratory:  CBC: WBC: 7.6, HGB: 13.6, PLT: 398  CMP: Glucose 105 (H), Albumin 3.3 (L), Creatinine 1.07 (H), o/w WNL     Lipase: " 106    UA with Microscopic: Blood small (A), Protein albumin 10 (A), Leukocyte esterase small (A), RBC 3 (H), Bacteria few (A), SE 3 (H), Mucous present (A), o/w WNL  HCG urine: Negative    Interventions:  0020 GI Cocktail (Maalox/Mylanta and viscous Lidocaine), 30 mL suspension, PO    0044 NS 1L IV  0046 Morphine 4 mg IV   Zofran 4 mg IV  0047 Pepcid 20 mg IV    Emergency Department Course:  Nursing notes and vitals reviewed. (8332) I performed an exam of the patient as documented above.     IV inserted. Medicine administered as documented above. Blood drawn. This was sent to the lab for further testing, results above.    (0029) I rechecked the patient and discussed the results of her workup thus far. She had no relief following a GI cocktail.    The patient was sent for an abdominal/pelvic CT while in the emergency department, findings above.     (0221) I reevaluated the patient and provided an update in regards to her ED course.  We discussed the plan for discharge, and she felt comfortable going home.     Findings and plan explained to the Patient. Patient discharged home with instructions regarding supportive care, medications, and reasons to return. The importance of close follow-up was reviewed. The patient was prescribed omeprazole, Zofran, and tramadol.    I personally reviewed the laboratory results with the Patient and answered all related questions prior to discharge.      Impression & Plan      Medical Decision Making:  Arelis Lomeli is a 23 year old female who presents with abdominal pain and nausea.  She looks overall well and without a concerning etiology of abdominal pain.  A broad differential diagnosis was of course considered.  The workup in the ED is at this point negative, aside from fluid in the bowel suggesting enteritis..  No other more dangerous etiology for the patient's pain is found at this point and my suspicion of an intraabdominal catastrophe or other worrisome etiology is very  low. CT and lab work are reassuring.  I will not therefore admit her for serial exams and further workup.  Patient is hemodynamically stable in ED. Plan is home with abdominal pain recheck by primary care physician or return to ED at anytime.  Return for fevers greater than 102, increasing pain, other new symptoms develop.  Abdominal pain handout given.  Questions were answered.     Diagnosis:    ICD-10-CM    1. Periumbilical abdominal pain R10.33        Disposition:  discharged to home    Discharge Medications:     Medication List      Started    omeprazole 20 MG DR capsule  Commonly known as:  priLOSEC  20 mg, Oral, 2 TIMES DAILY     ondansetron 4 MG ODT tab  Commonly known as:  ZOFRAN ODT  4 mg, Oral, EVERY 8 HOURS PRN     traMADol 50 MG tablet  Commonly known as:  ULTRAM  50 mg, Oral, EVERY 6 HOURS PRN            Scribe Disclosure:  I, Yuli Michaels, am serving as a scribe on 4/28/2019 at 11:55 PM to personally document services performed by Bin Vergara MD based on my observations and the provider's statements to me.      Yuli Michaels  4/28/2019    EMERGENCY DEPARTMENT       Bin Vergara MD  05/07/19 0632

## 2019-04-29 NOTE — ED NOTES
Pt medicated for pain and nausea, updated on wait times and plan of care. Verbalized understanding.

## 2020-11-03 ENCOUNTER — OFFICE VISIT (OUTPATIENT)
Dept: OBGYN | Facility: CLINIC | Age: 25
End: 2020-11-03
Payer: COMMERCIAL

## 2020-11-03 VITALS
BODY MASS INDEX: 34.65 KG/M2 | WEIGHT: 228.6 LBS | HEART RATE: 79 BPM | SYSTOLIC BLOOD PRESSURE: 107 MMHG | DIASTOLIC BLOOD PRESSURE: 74 MMHG | HEIGHT: 68 IN

## 2020-11-03 DIAGNOSIS — Z30.41 SURVEILLANCE FOR BIRTH CONTROL, ORAL CONTRACEPTIVES: ICD-10-CM

## 2020-11-03 DIAGNOSIS — Z23 NEED FOR PROPHYLACTIC VACCINATION AND INOCULATION AGAINST INFLUENZA: ICD-10-CM

## 2020-11-03 DIAGNOSIS — Z12.4 SCREENING FOR MALIGNANT NEOPLASM OF CERVIX: ICD-10-CM

## 2020-11-03 DIAGNOSIS — Z01.419 ENCOUNTER FOR GYNECOLOGICAL EXAMINATION WITHOUT ABNORMAL FINDING: Primary | ICD-10-CM

## 2020-11-03 PROCEDURE — 99385 PREV VISIT NEW AGE 18-39: CPT | Mod: 25 | Performed by: OBSTETRICS & GYNECOLOGY

## 2020-11-03 PROCEDURE — 90686 IIV4 VACC NO PRSV 0.5 ML IM: CPT | Performed by: OBSTETRICS & GYNECOLOGY

## 2020-11-03 PROCEDURE — G0145 SCR C/V CYTO,THINLAYER,RESCR: HCPCS | Performed by: OBSTETRICS & GYNECOLOGY

## 2020-11-03 PROCEDURE — 90471 IMMUNIZATION ADMIN: CPT | Performed by: OBSTETRICS & GYNECOLOGY

## 2020-11-03 RX ORDER — LEVONORGESTREL/ETHIN.ESTRADIOL 0.1-0.02MG
1 TABLET ORAL DAILY
Qty: 84 TABLET | Refills: 3 | Status: SHIPPED | OUTPATIENT
Start: 2020-11-03

## 2020-11-03 ASSESSMENT — MIFFLIN-ST. JEOR: SCORE: 1820.42

## 2020-11-03 ASSESSMENT — PAIN SCALES - GENERAL: PAINLEVEL: NO PAIN (0)

## 2020-11-03 NOTE — LETTER
November 11, 2020      Arelis Lomeli  [address] [facility] [address]        Dear ,    We are happy to inform you that your recent Pap smear is normal.    It is recommended that you have your next Pap smear in 3 years. You will also need to return to the clinic every year for an annual wellness visit.    If you have additional questions regarding this result, please contact our office and we will be happy to assist you.      Sincerely,    Your [facility] Care Team

## 2020-11-03 NOTE — PROGRESS NOTES
Arelis is a 25 year old  female who presents for annual exam.     Menses are rare and variable lasting 3 days.  Menses flow: light and heavy.  Patient's last menstrual period was 10/20/2020.. Using none for contraception.  She is not currently considering pregnancy.  Besides routine health maintenance, she has no other health concerns today .  Getting wisdom teeth out this week.   for [email or web address], going back to working nights due to seniority issues.   Thinks she had Hep C and HIV tested at [facility], will look for records.   No new sexual partners since last GC/CT test.   GYNECOLOGIC HISTORY:  Menarche: 10  Age at first intercourse: 16 Number of lifetime partners: more than 6   Arelis is sexually active with 1 male partner(s) and is currently in monogamous relationship with  .    History sexually transmitted infections:Chlamydia and HPV   STI testing offered?  Declined  SONNY exposure: No  History of abnormal Pap smear: YES - updated in Problem List and Health Maintenance accordingly  Family history of breast CA: No  Family history of uterine/ovarian CA: No    Family history of colon CA: No    HEALTH MAINTENANCE:  Cholesterol: (No results found for: CHOL History of abnormal lipids: No  Mammo: n/a . History of abnormal Mammo: Not applicable.  Regular Self Breast Exams: Yes  Calcium/Vitamin D intake: source:  dairy, dietary supplement(s) Adequate? Yes  TSH: (No results found for: TSH )  Pap; (  Lab Results   Component Value Date    PAP ASC-US 2016    )    HISTORY:  OB History    Para Term  AB Living   0 0 0 0 0 0   SAB TAB Ectopic Multiple Live Births   0 0 0 0 0     Past Medical History:   Diagnosis Date     Atypical squamous cells of undetermined significance (ASCUS) on Papanicolaou smear of cervix 2016    No HPV was done due to pts. age     NO ACTIVE PROBLEMS      Past Surgical History:   Procedure Laterality Date     HC TOOTH EXTRACTION  W/FORCEP       NO HISTORY OF SURGERY       TONSILLECTOMY       Family History   Problem Relation Age of Onset     Diabetes Sister         Type 1     Hypertension Maternal Grandfather      Hyperlipidemia Maternal Grandfather      Other Cancer Paternal Grandfather         Thyroid     Social History     Socioeconomic History     Marital status:      Spouse name: Not on file     Number of children: Not on file     Years of education: Not on file     Highest education level: Not on file   Occupational History     Not on file   Social Needs     Financial resource strain: Not on file     Food insecurity     Worry: Not on file     Inability: Not on file     Transportation needs     Medical: Not on file     Non-medical: Not on file   Tobacco Use     Smoking status: Never Smoker     Smokeless tobacco: Never Used   Substance and Sexual Activity     Alcohol use: Yes     Alcohol/week: 0.0 standard drinks     Comment: social     Drug use: No     Sexual activity: Yes     Partners: Male     Birth control/protection: OCP   Lifestyle     Physical activity     Days per week: Not on file     Minutes per session: Not on file     Stress: Not on file   Relationships     Social connections     Talks on phone: Not on file     Gets together: Not on file     Attends Baptist service: Not on file     Active member of club or organization: Not on file     Attends meetings of clubs or organizations: Not on file     Relationship status: Not on file     Intimate partner violence     Fear of current or ex partner: Not on file     Emotionally abused: Not on file     Physically abused: Not on file     Forced sexual activity: Not on file   Other Topics Concern     Not on file   Social History Narrative     Not on file       Current Outpatient Medications:      levonorgestrel-ethinyl estradiol (AVIANE) 0.1-20 MG-MCG tablet, Take 1 tablet by mouth daily, Disp: 84 tablet, Rfl: 3     levonorgestrel-ethinyl estradiol (SRONYX) 0.1-20 MG-MCG per  "tablet, Take 1 tablet by mouth daily (Patient not taking: Reported on 1/30/2019), Disp: 84 tablet, Rfl: 3   No Known Allergies    Past medical, surgical, social and family history were reviewed and updated in EPIC.    ROS:   C:     NEGATIVE for fever, chills, change in weight  I:       NEGATIVE for worrisome rashes, moles or lesions  E:     NEGATIVE for vision changes or irritation  E/M: NEGATIVE for ear, mouth and throat problems  R:     NEGATIVE for significant cough or SOB  CV:   NEGATIVE for chest pain, palpitations or peripheral edema  GI:     NEGATIVE for nausea, abdominal pain, heartburn, or change in bowel habits  :   NEGATIVE for frequency, dysuria, hematuria, vaginal discharge, or irregular bleeding  M:     NEGATIVE for significant arthralgias or myalgia  N:      NEGATIVE for weakness, dizziness or paresthesias  E:      NEGATIVE for temperature intolerance, skin/hair changes  P:      NEGATIVE for changes in mood or affect.    EXAM:  /74 (BP Location: Left arm, Patient Position: Sitting, Cuff Size: Adult Large)   Pulse 79   Ht 1.727 m (5' 8\")   Wt 103.7 kg (228 lb 9.6 oz)   LMP 10/20/2020   Breastfeeding No   BMI 34.76 kg/m     BMI: Body mass index is 34.76 kg/m .  Constitutional: healthy, alert and no distress  Head: Normocephalic. No masses, lesions, tenderness or abnormalities  Neck: Neck supple. Trachea midline. No adenopathy. Thyroid symmetric, normal size.   Cardiovascular: RRR.   Respiratory: Negative.   Breast: Breasts reveal mild symmetric fibrocystic densities, but there are no dominant, discrete, fixed or suspicious masses found.  Gastrointestinal: Abdomen soft, non-tender, non-distended. No masses, organomegaly.  :  Vulva:  No external lesions, normal female hair distribution, no inguinal adenopathy.    Urethra:  Midline, non-tender, well supported, no discharge  Vagina:  Moist, pink, no abnormal discharge, no lesions  Uterus:  Normal size, anteverted , non-tender, freely " mobile  Ovaries:  No masses appreciated, non-tender, mobile  Rectal Exam: deferred  Musculoskeletal: extremities normal  Skin: no suspicious lesions or rashes  Psychiatric: Affect appropriate, cooperative,mentation appears normal.     COUNSELING:   Reviewed preventive health counseling, as reflected in patient instructions       Regular exercise       Healthy diet/nutrition       Immunizations    Vaccinated for: Influenza             Contraception   reports that she has never smoked. She has never used smokeless tobacco.    Body mass index is 34.76 kg/m .  Weight management plan: Discussed healthy diet and exercise guidelines  FRAX Risk Assessment    ASSESSMENT:  25 year old female with satisfactory annual exam  (Z01.419) Encounter for gynecological examination without abnormal finding  (primary encounter diagnosis)  Comment:   Plan: Pap, flu shot today.     (Z23) Need for prophylactic vaccination and inoculation against influenza  Comment:   Plan: INFLUENZA VACCINE IM > 6 MONTHS VALENT IIV4         [70788], ADMIN 1st VACCINE            (Z12.4) Screening for malignant neoplasm of cervix  Comment:   Plan: Pap imaged thin layer screen reflex to HPV if         ASCUS - recommend age 25 - 29            (Z30.41) Surveillance for birth control, oral contraceptives  Comment:   Plan: levonorgestrel-ethinyl estradiol (AVIANE)         0.1-20 MG-MCG tablet        Has been on in the past. Often does not get periods. Wants to restart this one.

## 2020-11-05 LAB
COPATH REPORT: NORMAL
PAP: NORMAL

## 2020-12-28 ENCOUNTER — ANCILLARY PROCEDURE (OUTPATIENT)
Dept: GENERAL RADIOLOGY | Facility: CLINIC | Age: 25
End: 2020-12-28
Attending: PEDIATRICS
Payer: COMMERCIAL

## 2020-12-28 ENCOUNTER — NURSE TRIAGE (OUTPATIENT)
Dept: NURSING | Facility: CLINIC | Age: 25
End: 2020-12-28

## 2020-12-28 ENCOUNTER — OFFICE VISIT (OUTPATIENT)
Dept: ORTHOPEDICS | Facility: CLINIC | Age: 25
End: 2020-12-28
Payer: COMMERCIAL

## 2020-12-28 VITALS
BODY MASS INDEX: 34.56 KG/M2 | SYSTOLIC BLOOD PRESSURE: 108 MMHG | WEIGHT: 228 LBS | DIASTOLIC BLOOD PRESSURE: 74 MMHG | HEIGHT: 68 IN

## 2020-12-28 DIAGNOSIS — S50.12XA CONTUSION OF LEFT FOREARM, INITIAL ENCOUNTER: ICD-10-CM

## 2020-12-28 DIAGNOSIS — S59.912A FOREARM INJURY, LEFT, INITIAL ENCOUNTER: Primary | ICD-10-CM

## 2020-12-28 DIAGNOSIS — S59.912A FOREARM INJURY, LEFT, INITIAL ENCOUNTER: ICD-10-CM

## 2020-12-28 PROCEDURE — 73090 X-RAY EXAM OF FOREARM: CPT | Mod: LT | Performed by: RADIOLOGY

## 2020-12-28 PROCEDURE — 99203 OFFICE O/P NEW LOW 30 MIN: CPT | Performed by: PEDIATRICS

## 2020-12-28 ASSESSMENT — MIFFLIN-ST. JEOR: SCORE: 1817.7

## 2020-12-28 NOTE — Clinical Note
12/28/2020         RE: Arelis Lomeli  [address]  [facility] [address]        Dear Colleague,    Thank you for referring your patient, Arelis Lomeli, to the [facility]. Please see a copy of my visit note below.    Sports Medicine Clinic Visit    PCP: Tamara, [facility]    Arelis Lomeli is a 25 year old female who is seen  as a self referral AIC presenting with a left forearm/elbow injury.  She slipped and fell landing on some steps 12/27/20.  She has pain with swelling and bruising just distal to her olecranon process.  Pain thru the entire forearm.  No difficulty with flexion and extension of the elbow.   She is right hand dominant.      Injury: fall  **  Occasional left wrist stiffness/soreness, otherwise not really an issue at the wrist.    Location of Pain: left upper forearm   Duration of Pain: 1 day(s)  Rating of Pain at worst: 9/10  Rating of Pain Currently: 2/10  Symptoms are better with: Ice and Rest  Symptoms are worse with: pressure over area   Additional Features:   Positive: swelling, bruising and paresthesias   Negative: popping, grinding, catching, locking, instability, numbness, weakness, pain in other joints and systemic symptoms  Other evaluation and/or treatments so far consists of: Nothing  Prior History of related problems: HX of left wrist fx in high school     Social History:      Review of Systems  Musculoskeletal: as above  Remainder of review of systems is negative including constitutional, CV, pulmonary, GI, Skin and Neurologic except as noted in HPI or medical history.    Past Medical History:   Diagnosis Date     Atypical squamous cells of undetermined significance (ASCUS) on Papanicolaou smear of cervix 09/06/2016    No HPV was done due to pts. age     NO ACTIVE PROBLEMS      Past Surgical History:   Procedure Laterality Date     HC TOOTH EXTRACTION W/FORCEP       NO HISTORY OF SURGERY        "TONSILLECTOMY       Family History   Problem Relation Age of Onset     Diabetes Sister         Type 1     Hypertension Maternal Grandfather      Hyperlipidemia Maternal Grandfather      Other Cancer Paternal Grandfather         Thyroid     Social History     Socioeconomic History     Marital status:      Spouse name: Not on file     Number of children: Not on file     Years of education: Not on file     Highest education level: Not on file   Occupational History     Not on file   Social Needs     Financial resource strain: Not on file     Food insecurity     Worry: Not on file     Inability: Not on file     Transportation needs     Medical: Not on file     Non-medical: Not on file   Tobacco Use     Smoking status: Never Smoker     Smokeless tobacco: Never Used   Substance and Sexual Activity     Alcohol use: Yes     Alcohol/week: 0.0 standard drinks     Comment: social     Drug use: No     Sexual activity: Yes     Partners: Male     Birth control/protection: OCP   Lifestyle     Physical activity     Days per week: Not on file     Minutes per session: Not on file     Stress: Not on file   Relationships     Social connections     Talks on phone: Not on file     Gets together: Not on file     Attends Anglican service: Not on file     Active member of club or organization: Not on file     Attends meetings of clubs or organizations: Not on file     Relationship status: Not on file     Intimate partner violence     Fear of current or ex partner: Not on file     Emotionally abused: Not on file     Physically abused: Not on file     Forced sexual activity: Not on file   Other Topics Concern     Not on file   Social History Narrative     Not on file       Objective  /74   Ht 1.727 m (5' 8\")   Wt 103.4 kg (228 lb)   BMI 34.67 kg/m      GENERAL APPEARANCE: healthy, alert and no distress   GAIT: NORMAL  SKIN: no suspicious lesions or rashes  NEURO: Normal strength and tone, mentation intact and speech " normal  PSYCH:  mentation appears normal and affect normal/bright  HEENT: no scleral icterus  CV: distal perfusion intact  RESP: nonlabored breathing      Left Elbow/forearm exam:    Inspection:       ecchymosis noted proximal ulnar forearm       Mild swelling same level; area of swelling noted on x-ray also    ROM:       full with flexion, extension, forearm supination and pronation.    Strength:  Grossly intact, particularly with , finger abduction    Tender:  Proximal ulna, site of impact    Non-Tender:       remainder of elbow area    Sensation:  Grossly intact except mild decrease light touch adjacent to contusion     Skin:       well perfused       capillary refill brisk        Radiology:  Visualized radiographs as noted below, and reviewed the images with the patient; if the report was available at the time of the visit, the report was reviewed as well.  No acute fracture noted. There is a cystic-like lucency in the lunate, appears chronic.    Recent Results (from the past 24 hour(s))   XR Forearm LT 2 vw    Narrative    LEFT FOREARM TWO VIEWS  12/28/2020 9:13 AM     HISTORY: Forearm injury, left, initial encounter.    COMPARISON: None.      Impression    IMPRESSION: No evidence of acute fracture or subluxation. There is a  cyst, likely a subchondral cyst in the radial aspect of the lunate at  the scapholunate articulation.         Assessment:  1. Forearm injury, left, initial encounter    2. Contusion of left forearm, initial encounter        Plan:  Discussed the assessment with the patient.  Topical Treatments: Ice prn  Over the counter medication: prn  X-ray of the forearm reviewed.  Activity Modification: discussed. I think she is safe to participate in activities as tolerated.  Next work shift in 2 days.  Medical Equipment: discussed consideration of padding of the area; she may pursue on own, or contact clinic if interested    Regarding the wrist finding on x-ray, is not acute. If bothersome in the  future, imaging with MRI may be a consideration. This is not her concern today.    Follow up: will leave open ended.  Questions answered. Discussed signs and symptoms that may indicate more serious issues; the patient was instructed to seek appropriate care if noted. Arelis indicates understanding of these issues and agrees with the plan.      Shane Chapa DO, CAQ          This note consists of symbols derived from keyboarding, dictation and/or voice recognition software. As a result, there may be errors in the script that have gone undetected. Please consider this when interpreting information found in this chart.          Again, thank you for allowing me to participate in the care of your patient.        Sincerely,        Shane Chapa DO

## 2020-12-28 NOTE — TELEPHONE ENCOUNTER
of patient calling as she fell and injured her arm.  He would like an appointment for her to be seen in [facility] today for possible arm fracture.    No open fracture or bleeding.    Patient herself never spoke.    Transferred him to clinic scheduling to see if they could gt in to clinic this morning, but instructed him to go to ER if cannot be seen in clinic.  He agreed.    Candy Barron RN  [facility] Nurse Advisor    Additional Information    Negative: Major bleeding (actively dripping or spurting) that can't be stopped    Negative: Sounds like a life-threatening emergency to the triager    Negative: Wound looks infected    Negative: Arm pain from overuse (e.g., sports, lifting, physical work)    Negative: Arm pain not from an injury    Negative: Shoulder injury is main concern    Negative: Elbow injury is main concern    Negative: Hand or wrist injury is main concern    Can't move injured arm at all    Negative: Looks like a broken bone or dislocated joint (crooked or deformed)    Negative: Bullet wound, stabbed by knife, or other serious penetrating wound    Protocols used: ARM INJURY-A-OH

## 2020-12-28 NOTE — PROGRESS NOTES
Sports Medicine Clinic Visit    PCP: [facility]    Arelisyu Lomeli is a 25 year old female who is seen  as a self referral AIC presenting with a left forearm/elbow injury.  She slipped and fell landing on some steps 12/27/20.  She has pain with swelling and bruising just distal to her olecranon process.  Pain thru the entire forearm.  No difficulty with flexion and extension of the elbow.   She is right hand dominant.      Injury: fall  **  Occasional left wrist stiffness/soreness, otherwise not really an issue at the wrist.    Location of Pain: left upper forearm   Duration of Pain: 1 day(s)  Rating of Pain at worst: 9/10  Rating of Pain Currently: 2/10  Symptoms are better with: Ice and Rest  Symptoms are worse with: pressure over area   Additional Features:   Positive: swelling, bruising and paresthesias   Negative: popping, grinding, catching, locking, instability, numbness, weakness, pain in other joints and systemic symptoms  Other evaluation and/or treatments so far consists of: Nothing  Prior History of related problems: HX of left wrist fx in high school     Social History:      Review of Systems  Musculoskeletal: as above  Remainder of review of systems is negative including constitutional, CV, pulmonary, GI, Skin and Neurologic except as noted in HPI or medical history.    Past Medical History:   Diagnosis Date     Atypical squamous cells of undetermined significance (ASCUS) on Papanicolaou smear of cervix 09/06/2016    No HPV was done due to pts. age     NO ACTIVE PROBLEMS      Past Surgical History:   Procedure Laterality Date     HC TOOTH EXTRACTION W/FORCEP       NO HISTORY OF SURGERY       TONSILLECTOMY       Family History   Problem Relation Age of Onset     Diabetes Sister         Type 1     Hypertension Maternal Grandfather      Hyperlipidemia Maternal Grandfather      Other Cancer Paternal Grandfather         Thyroid     Social History     Socioeconomic  "History     Marital status:      Spouse name: Not on file     Number of children: Not on file     Years of education: Not on file     Highest education level: Not on file   Occupational History     Not on file   Social Needs     Financial resource strain: Not on file     Food insecurity     Worry: Not on file     Inability: Not on file     Transportation needs     Medical: Not on file     Non-medical: Not on file   Tobacco Use     Smoking status: Never Smoker     Smokeless tobacco: Never Used   Substance and Sexual Activity     Alcohol use: Yes     Alcohol/week: 0.0 standard drinks     Comment: social     Drug use: No     Sexual activity: Yes     Partners: Male     Birth control/protection: OCP   Lifestyle     Physical activity     Days per week: Not on file     Minutes per session: Not on file     Stress: Not on file   Relationships     Social connections     Talks on phone: Not on file     Gets together: Not on file     Attends Confucianist service: Not on file     Active member of club or organization: Not on file     Attends meetings of clubs or organizations: Not on file     Relationship status: Not on file     Intimate partner violence     Fear of current or ex partner: Not on file     Emotionally abused: Not on file     Physically abused: Not on file     Forced sexual activity: Not on file   Other Topics Concern     Not on file   Social History Narrative     Not on file       Objective  /74   Ht 1.727 m (5' 8\")   Wt 103.4 kg (228 lb)   BMI 34.67 kg/m      GENERAL APPEARANCE: healthy, alert and no distress   GAIT: NORMAL  SKIN: no suspicious lesions or rashes  NEURO: Normal strength and tone, mentation intact and speech normal  PSYCH:  mentation appears normal and affect normal/bright  HEENT: no scleral icterus  CV: distal perfusion intact  RESP: nonlabored breathing      Left Elbow/forearm exam:    Inspection:       ecchymosis noted proximal ulnar forearm       Mild swelling same level; area of " swelling noted on x-ray also    ROM:       full with flexion, extension, forearm supination and pronation.    Strength:  Grossly intact, particularly with , finger abduction    Tender:  Proximal ulna, site of impact    Non-Tender:       remainder of elbow area    Sensation:  Grossly intact except mild decrease light touch adjacent to contusion     Skin:       well perfused       capillary refill brisk        Radiology:  Visualized radiographs as noted below, and reviewed the images with the patient; if the report was available at the time of the visit, the report was reviewed as well.  No acute fracture noted. There is a cystic-like lucency in the lunate, appears chronic.    Recent Results (from the past 24 hour(s))   XR Forearm LT 2 vw    Narrative    LEFT FOREARM TWO VIEWS  12/28/2020 9:13 AM     HISTORY: Forearm injury, left, initial encounter.    COMPARISON: None.      Impression    IMPRESSION: No evidence of acute fracture or subluxation. There is a  cyst, likely a subchondral cyst in the radial aspect of the lunate at  the scapholunate articulation.         Assessment:  1. Forearm injury, left, initial encounter    2. Contusion of left forearm, initial encounter        Plan:  Discussed the assessment with the patient.  Topical Treatments: Ice prn  Over the counter medication: prn  X-ray of the forearm reviewed.  Activity Modification: discussed. I think she is safe to participate in activities as tolerated.  Next work shift in 2 days.  Medical Equipment: discussed consideration of padding of the area; she may pursue on own, or contact clinic if interested    Regarding the wrist finding on x-ray, is not acute. If bothersome in the future, imaging with MRI may be a consideration. This is not her concern today.    Follow up: will leave open ended.  Questions answered. Discussed signs and symptoms that may indicate more serious issues; the patient was instructed to seek appropriate care if noted. Arelis  indicates understanding of these issues and agrees with the plan.      Shane Chapa, DO, CAQ          This note consists of symbols derived from keyboarding, dictation and/or voice recognition software. As a result, there may be errors in the script that have gone undetected. Please consider this when interpreting information found in this chart.

## 2021-10-24 ENCOUNTER — HEALTH MAINTENANCE LETTER (OUTPATIENT)
Age: 26
End: 2021-10-24

## 2021-12-19 ENCOUNTER — HEALTH MAINTENANCE LETTER (OUTPATIENT)
Age: 26
End: 2021-12-19

## 2022-01-04 ENCOUNTER — APPOINTMENT (OUTPATIENT)
Dept: URBAN - METROPOLITAN AREA CLINIC 256 | Age: 27
Setting detail: DERMATOLOGY
End: 2022-01-05

## 2022-01-04 VITALS — HEIGHT: 68 IN | RESPIRATION RATE: 15 BRPM | WEIGHT: 220 LBS

## 2022-01-04 DIAGNOSIS — D22 MELANOCYTIC NEVI: ICD-10-CM

## 2022-01-04 DIAGNOSIS — L81.4 OTHER MELANIN HYPERPIGMENTATION: ICD-10-CM

## 2022-01-04 PROBLEM — D22.5 MELANOCYTIC NEVI OF TRUNK: Status: ACTIVE | Noted: 2022-01-04

## 2022-01-04 PROCEDURE — OTHER PHOTO-DOCUMENTATION: OTHER

## 2022-01-04 PROCEDURE — OTHER COUNSELING: OTHER

## 2022-01-04 PROCEDURE — 99203 OFFICE O/P NEW LOW 30 MIN: CPT

## 2022-01-04 PROCEDURE — OTHER REASSURANCE: OTHER

## 2022-01-04 ASSESSMENT — LOCATION ZONE DERM
LOCATION ZONE: TRUNK
LOCATION ZONE: ARM

## 2022-01-04 ASSESSMENT — LOCATION SIMPLE DESCRIPTION DERM
LOCATION SIMPLE: RIGHT SHOULDER
LOCATION SIMPLE: LEFT SHOULDER
LOCATION SIMPLE: LEFT UPPER BACK

## 2022-01-04 ASSESSMENT — LOCATION DETAILED DESCRIPTION DERM
LOCATION DETAILED: LEFT LATERAL UPPER BACK
LOCATION DETAILED: RIGHT POSTERIOR SHOULDER
LOCATION DETAILED: LEFT POSTERIOR SHOULDER
LOCATION DETAILED: LEFT MID-UPPER BACK

## 2022-01-04 NOTE — HPI: MOLE CHECK
What Is The Reason For Today's Visit?: Mole Check
Additional History: Her cousin noticed these two lesions on her back growing.

## 2022-01-04 NOTE — PROCEDURE: PHOTO-DOCUMENTATION
Detail Level: Zone
Details (Free Text): 1/4/2022
Photo Preface (Leave Blank If You Do Not Want): Photographs were obtained today

## 2022-07-15 ENCOUNTER — HOSPITAL ENCOUNTER (OUTPATIENT)
Dept: GENERAL RADIOLOGY | Facility: CLINIC | Age: 27
Discharge: HOME | End: 2022-07-15
Attending: OBSTETRICS & GYNECOLOGY | Admitting: OBSTETRICS & GYNECOLOGY
Payer: COMMERCIAL

## 2022-07-15 DIAGNOSIS — N97.9 PRIMARY FEMALE INFERTILITY: ICD-10-CM

## 2022-07-15 PROCEDURE — 255N000002 HC RX 255 OP 636: Performed by: OBSTETRICS & GYNECOLOGY

## 2022-07-15 PROCEDURE — 74740 X-RAY FEMALE GENITAL TRACT: CPT

## 2022-07-15 RX ORDER — IOPAMIDOL 510 MG/ML
50 INJECTION, SOLUTION INTRAVASCULAR ONCE
Status: COMPLETED | OUTPATIENT
Start: 2022-07-15 | End: 2022-07-15

## 2022-07-15 RX ADMIN — IOPAMIDOL 10 ML: 510 INJECTION, SOLUTION INTRAVASCULAR at 08:06

## 2022-10-15 ENCOUNTER — HEALTH MAINTENANCE LETTER (OUTPATIENT)
Age: 27
End: 2022-10-15

## 2023-03-26 ENCOUNTER — HEALTH MAINTENANCE LETTER (OUTPATIENT)
Age: 28
End: 2023-03-26

## 2023-09-11 LAB — PAP SMEAR - HIM PATIENT REPORTED: NEGATIVE

## 2024-05-26 ENCOUNTER — HEALTH MAINTENANCE LETTER (OUTPATIENT)
Age: 29
End: 2024-05-26

## 2024-08-20 ENCOUNTER — APPOINTMENT (OUTPATIENT)
Dept: URBAN - METROPOLITAN AREA CLINIC 253 | Age: 29
Setting detail: DERMATOLOGY
End: 2024-08-20

## 2024-08-20 VITALS — WEIGHT: 230 LBS | HEIGHT: 68 IN | RESPIRATION RATE: 14 BRPM

## 2024-08-20 DIAGNOSIS — L21.8 OTHER SEBORRHEIC DERMATITIS: ICD-10-CM

## 2024-08-20 DIAGNOSIS — L57.8 OTHER SKIN CHANGES DUE TO CHRONIC EXPOSURE TO NONIONIZING RADIATION: ICD-10-CM

## 2024-08-20 PROCEDURE — OTHER COUNSELING: OTHER

## 2024-08-20 PROCEDURE — 99204 OFFICE O/P NEW MOD 45 MIN: CPT

## 2024-08-20 PROCEDURE — OTHER PRESCRIPTION: OTHER

## 2024-08-20 PROCEDURE — OTHER MIPS QUALITY: OTHER

## 2024-08-20 RX ORDER — TRETIONIN 0.25 MG/G
0.025% CREAM TOPICAL
Qty: 45 | Refills: 5 | Status: ERX | COMMUNITY
Start: 2024-08-20

## 2024-08-20 RX ORDER — KETOCONAZOLE 20 MG/ML
2% SHAMPOO, SUSPENSION TOPICAL QD
Qty: 120 | Refills: 11 | Status: ERX | COMMUNITY
Start: 2024-08-20

## 2024-08-20 RX ORDER — FLUOCINOLONE ACETONIDE 0.11 MG/ML
0.01% OIL TOPICAL BID
Qty: 118.28 | Refills: 2 | Status: ERX | COMMUNITY
Start: 2024-08-20

## 2024-08-20 NOTE — HPI: RASH
Is This A New Presentation, Or A Follow-Up?: Rash
Additional History: The patient reports a history of seborrheic dermatitis. She has had good improvement with ketoconazole shampoo and a topical oil in the past.

## 2025-06-14 ENCOUNTER — HEALTH MAINTENANCE LETTER (OUTPATIENT)
Age: 30
End: 2025-06-14

## 2025-07-18 ENCOUNTER — HOSPITAL ENCOUNTER (OUTPATIENT)
Age: 30
Discharge: HOME | End: 2025-07-18
Payer: COMMERCIAL

## 2025-07-18 ENCOUNTER — TRANSFERRED RECORDS (OUTPATIENT)
Dept: HEALTH INFORMATION MANAGEMENT | Facility: CLINIC | Age: 30
End: 2025-07-18

## 2025-07-18 DIAGNOSIS — Z34.91: Primary | ICD-10-CM

## 2025-07-18 DIAGNOSIS — Z3A.09: ICD-10-CM

## 2025-07-18 LAB
HBA1C MFR BLD: 5.3 % (ref 0–5.6)
TSH SERPL-ACNC: 1.2 UIU/ML (ref 0.27–4.2)

## 2025-07-18 PROCEDURE — 76817 TRANSVAGINAL US OBSTETRIC: CPT

## 2025-07-18 PROCEDURE — 84443 ASSAY THYROID STIM HORMONE: CPT

## 2025-07-18 PROCEDURE — 83021 HEMOGLOBIN CHROMOTOGRAPHY: CPT

## 2025-07-18 PROCEDURE — 87086 URINE CULTURE/COLONY COUNT: CPT

## 2025-08-14 ENCOUNTER — TRANSFERRED RECORDS (OUTPATIENT)
Dept: HEALTH INFORMATION MANAGEMENT | Facility: CLINIC | Age: 30
End: 2025-08-14
Payer: COMMERCIAL

## 2025-08-14 LAB — PHQ9 SCORE: 2

## 2025-08-20 ENCOUNTER — MEDICAL CORRESPONDENCE (OUTPATIENT)
Dept: HEALTH INFORMATION MANAGEMENT | Facility: CLINIC | Age: 30
End: 2025-08-20
Payer: COMMERCIAL

## 2025-08-27 ENCOUNTER — TRANSCRIBE ORDERS (OUTPATIENT)
Dept: OTHER | Age: 30
End: 2025-08-27

## 2025-08-27 DIAGNOSIS — Z80.8 FAMILY HISTORY OF MALIGNANT NEOPLASM OF OTHER ORGANS OR SYSTEMS: ICD-10-CM

## 2025-08-27 DIAGNOSIS — E04.1 NONTOXIC SINGLE THYROID NODULE: Primary | ICD-10-CM

## 2025-08-28 ENCOUNTER — PATIENT OUTREACH (OUTPATIENT)
Dept: CARE COORDINATION | Facility: CLINIC | Age: 30
End: 2025-08-28

## 2025-09-04 ENCOUNTER — TELEPHONE (OUTPATIENT)
Dept: ENDOCRINOLOGY | Facility: CLINIC | Age: 30
End: 2025-09-04
Payer: COMMERCIAL